# Patient Record
Sex: FEMALE | Race: WHITE | Employment: FULL TIME | URBAN - METROPOLITAN AREA
[De-identification: names, ages, dates, MRNs, and addresses within clinical notes are randomized per-mention and may not be internally consistent; named-entity substitution may affect disease eponyms.]

---

## 2017-03-15 PROCEDURE — 87086 URINE CULTURE/COLONY COUNT: CPT | Performed by: OBSTETRICS & GYNECOLOGY

## 2017-03-15 PROCEDURE — 87624 HPV HI-RISK TYP POOLED RSLT: CPT | Performed by: OBSTETRICS & GYNECOLOGY

## 2017-03-15 PROCEDURE — 88175 CYTOPATH C/V AUTO FLUID REDO: CPT | Performed by: OBSTETRICS & GYNECOLOGY

## 2017-03-15 PROCEDURE — 81001 URINALYSIS AUTO W/SCOPE: CPT | Performed by: OBSTETRICS & GYNECOLOGY

## 2018-04-25 PROCEDURE — 88175 CYTOPATH C/V AUTO FLUID REDO: CPT | Performed by: OBSTETRICS & GYNECOLOGY

## 2018-04-25 PROCEDURE — 87624 HPV HI-RISK TYP POOLED RSLT: CPT | Performed by: OBSTETRICS & GYNECOLOGY

## 2018-09-25 PROBLEM — Z79.891 CHRONIC PRESCRIPTION OPIATE USE: Status: ACTIVE | Noted: 2018-09-25

## 2019-10-15 PROBLEM — IMO0002 CHRONIC MIGRAINE: Status: ACTIVE | Noted: 2019-10-15

## 2020-03-21 ENCOUNTER — APPOINTMENT (OUTPATIENT)
Dept: ULTRASOUND IMAGING | Facility: HOSPITAL | Age: 40
End: 2020-03-21
Attending: INTERNAL MEDICINE
Payer: COMMERCIAL

## 2020-03-21 ENCOUNTER — HOSPITAL ENCOUNTER (OUTPATIENT)
Facility: HOSPITAL | Age: 40
Setting detail: OBSERVATION
Discharge: HOME | End: 2020-03-23
Attending: INTERNAL MEDICINE | Admitting: INTERNAL MEDICINE
Payer: COMMERCIAL

## 2020-03-21 PROBLEM — R19.7 DIARRHEA: Status: ACTIVE | Noted: 2020-03-21

## 2020-03-21 LAB
APAP SERPL-MCNC: <2 UG/ML (ref 10–30)
HAV IGM SER QL: NONREACTIVE
HBV CORE IGM SER QL: NONREACTIVE
HBV SURFACE AG SERPL QL IA: NONREACTIVE
HCV AB SERPL QL IA: NONREACTIVE

## 2020-03-21 PROCEDURE — 86256 FLUORESCENT ANTIBODY TITER: CPT | Performed by: INTERNAL MEDICINE

## 2020-03-21 PROCEDURE — 80329 ANALGESICS NON-OPIOID 1 OR 2: CPT | Performed by: INTERNAL MEDICINE

## 2020-03-21 PROCEDURE — 86403 PARTICLE AGGLUT ANTBDY SCRN: CPT | Performed by: INTERNAL MEDICINE

## 2020-03-21 PROCEDURE — 86645 CMV ANTIBODY IGM: CPT | Performed by: INTERNAL MEDICINE

## 2020-03-21 PROCEDURE — 93975 VASCULAR STUDY: CPT | Performed by: INTERNAL MEDICINE

## 2020-03-21 PROCEDURE — 86665 EPSTEIN-BARR CAPSID VCA: CPT | Performed by: INTERNAL MEDICINE

## 2020-03-21 PROCEDURE — 86664 EPSTEIN-BARR NUCLEAR ANTIGEN: CPT | Performed by: INTERNAL MEDICINE

## 2020-03-21 PROCEDURE — 83010 ASSAY OF HAPTOGLOBIN QUANT: CPT | Performed by: INTERNAL MEDICINE

## 2020-03-21 PROCEDURE — 86644 CMV ANTIBODY: CPT | Performed by: INTERNAL MEDICINE

## 2020-03-21 PROCEDURE — 86038 ANTINUCLEAR ANTIBODIES: CPT | Performed by: INTERNAL MEDICINE

## 2020-03-21 PROCEDURE — 80074 ACUTE HEPATITIS PANEL: CPT | Performed by: INTERNAL MEDICINE

## 2020-03-21 PROCEDURE — 87798 DETECT AGENT NOS DNA AMP: CPT | Performed by: INTERNAL MEDICINE

## 2020-03-21 PROCEDURE — 83516 IMMUNOASSAY NONANTIBODY: CPT | Performed by: INTERNAL MEDICINE

## 2020-03-21 PROCEDURE — 76700 US EXAM ABDOM COMPLETE: CPT | Performed by: INTERNAL MEDICINE

## 2020-03-21 RX ORDER — SODIUM PHOSPHATE, DIBASIC AND SODIUM PHOSPHATE, MONOBASIC 7; 19 G/133ML; G/133ML
1 ENEMA RECTAL ONCE AS NEEDED
Status: DISCONTINUED | OUTPATIENT
Start: 2020-03-21 | End: 2020-03-23

## 2020-03-21 RX ORDER — LEVOFLOXACIN 5 MG/ML
750 INJECTION, SOLUTION INTRAVENOUS EVERY 24 HOURS
Status: DISCONTINUED | OUTPATIENT
Start: 2020-03-21 | End: 2020-03-23

## 2020-03-21 RX ORDER — POTASSIUM CHLORIDE 20 MEQ/1
40 TABLET, EXTENDED RELEASE ORAL ONCE
Status: COMPLETED | OUTPATIENT
Start: 2020-03-21 | End: 2020-03-21

## 2020-03-21 RX ORDER — METRONIDAZOLE 500 MG/100ML
500 INJECTION, SOLUTION INTRAVENOUS EVERY 8 HOURS
Status: DISCONTINUED | OUTPATIENT
Start: 2020-03-21 | End: 2020-03-23

## 2020-03-21 RX ORDER — POLYETHYLENE GLYCOL 3350 17 G/17G
17 POWDER, FOR SOLUTION ORAL DAILY PRN
Status: DISCONTINUED | OUTPATIENT
Start: 2020-03-21 | End: 2020-03-23

## 2020-03-21 RX ORDER — TOPIRAMATE 100 MG/1
100 TABLET, FILM COATED ORAL 2 TIMES DAILY
Status: DISCONTINUED | OUTPATIENT
Start: 2020-03-21 | End: 2020-03-22

## 2020-03-21 RX ORDER — ENOXAPARIN SODIUM 100 MG/ML
40 INJECTION SUBCUTANEOUS EVERY EVENING
Status: DISCONTINUED | OUTPATIENT
Start: 2020-03-21 | End: 2020-03-23

## 2020-03-21 RX ORDER — BISACODYL 10 MG
10 SUPPOSITORY, RECTAL RECTAL
Status: DISCONTINUED | OUTPATIENT
Start: 2020-03-21 | End: 2020-03-23

## 2020-03-21 RX ORDER — ONDANSETRON 2 MG/ML
4 INJECTION INTRAMUSCULAR; INTRAVENOUS EVERY 6 HOURS PRN
Status: DISCONTINUED | OUTPATIENT
Start: 2020-03-21 | End: 2020-03-23

## 2020-03-21 RX ORDER — NORTRIPTYLINE HYDROCHLORIDE 25 MG/1
25 CAPSULE ORAL DAILY
Status: DISCONTINUED | OUTPATIENT
Start: 2020-03-22 | End: 2020-03-22

## 2020-03-21 RX ORDER — ALPRAZOLAM 0.5 MG/1
0.5 TABLET ORAL 2 TIMES DAILY PRN
Status: DISCONTINUED | OUTPATIENT
Start: 2020-03-21 | End: 2020-03-23

## 2020-03-21 RX ORDER — ACETAMINOPHEN 325 MG/1
650 TABLET ORAL EVERY 6 HOURS PRN
Status: DISCONTINUED | OUTPATIENT
Start: 2020-03-21 | End: 2020-03-21 | Stop reason: SDUPTHER

## 2020-03-21 RX ORDER — METOCLOPRAMIDE HYDROCHLORIDE 5 MG/ML
10 INJECTION INTRAMUSCULAR; INTRAVENOUS EVERY 8 HOURS PRN
Status: DISCONTINUED | OUTPATIENT
Start: 2020-03-21 | End: 2020-03-23

## 2020-03-21 RX ORDER — DOCUSATE SODIUM 100 MG/1
100 CAPSULE, LIQUID FILLED ORAL 2 TIMES DAILY
Status: DISCONTINUED | OUTPATIENT
Start: 2020-03-21 | End: 2020-03-23

## 2020-03-21 RX ORDER — ACETAMINOPHEN 325 MG/1
650 TABLET ORAL EVERY 6 HOURS PRN
Status: DISCONTINUED | OUTPATIENT
Start: 2020-03-21 | End: 2020-03-21

## 2020-03-21 RX ORDER — SODIUM CHLORIDE 9 MG/ML
INJECTION, SOLUTION INTRAVENOUS CONTINUOUS
Status: DISCONTINUED | OUTPATIENT
Start: 2020-03-21 | End: 2020-03-23

## 2020-03-21 RX ORDER — TRAZODONE HYDROCHLORIDE 50 MG/1
50 TABLET ORAL NIGHTLY
Status: DISCONTINUED | OUTPATIENT
Start: 2020-03-21 | End: 2020-03-23

## 2020-03-21 NOTE — PROGRESS NOTES
Taylor Aguilar NOTIFIED OF PATIENT ARRIVAL AND NEED OF ADMISSION ORDERS. DR. John Fontana ARRIVED TO SEE PATIENT.

## 2020-03-21 NOTE — PLAN OF CARE
Problem: Patient/Family Goals  Goal: Patient/Family Long Term Goal  Description  Patient's Long Term Goal: DISCHARGE    Interventions:  - PAIN TOLERABLE  -TOLERATING DIET    - See additional Care Plan goals for specific interventions   Outcome: Progressi appropriate nutritional intake (bariatric)  Description  INTERVENTIONS:  - Monitor for over-consumption  - Identify factors contributing to increased intake, treat as appropriate  - Monitor I&O, WT and lab values  - Obtain nutritional consult as needed  -

## 2020-03-22 ENCOUNTER — APPOINTMENT (OUTPATIENT)
Dept: MRI IMAGING | Facility: HOSPITAL | Age: 40
End: 2020-03-22
Attending: INTERNAL MEDICINE
Payer: COMMERCIAL

## 2020-03-22 LAB
ALBUMIN SERPL-MCNC: 2.7 G/DL (ref 3.4–5)
ALBUMIN/GLOB SERPL: 0.8 {RATIO} (ref 1–2)
ALP LIVER SERPL-CCNC: 97 U/L (ref 37–98)
ALT SERPL-CCNC: 588 U/L (ref 13–56)
AMMONIA PLAS-MCNC: 41 UMOL/L (ref 11–32)
ANION GAP SERPL CALC-SCNC: 8 MMOL/L (ref 0–18)
AST SERPL-CCNC: 327 U/L (ref 15–37)
BASOPHILS # BLD AUTO: 0.07 X10(3) UL (ref 0–0.2)
BASOPHILS NFR BLD AUTO: 1.5 %
BILIRUB DIRECT SERPL-MCNC: 5.1 MG/DL (ref 0–0.2)
BILIRUB SERPL-MCNC: 6.4 MG/DL (ref 0.1–2)
BUN BLD-MCNC: 5 MG/DL (ref 7–18)
BUN/CREAT SERPL: 7.2 (ref 10–20)
CALCIUM BLD-MCNC: 8 MG/DL (ref 8.5–10.1)
CHLORIDE SERPL-SCNC: 115 MMOL/L (ref 98–112)
CO2 SERPL-SCNC: 18 MMOL/L (ref 21–32)
CREAT BLD-MCNC: 0.69 MG/DL (ref 0.55–1.02)
DEPRECATED RDW RBC AUTO: 45.9 FL (ref 35.1–46.3)
EOSINOPHIL # BLD AUTO: 0.14 X10(3) UL (ref 0–0.7)
EOSINOPHIL NFR BLD AUTO: 3.1 %
ERYTHROCYTE [DISTWIDTH] IN BLOOD BY AUTOMATED COUNT: 14 % (ref 11–15)
GLOBULIN PLAS-MCNC: 3.2 G/DL (ref 2.8–4.4)
GLUCOSE BLD-MCNC: 77 MG/DL (ref 70–99)
HAPTOGLOB SERPL-MCNC: 39.7 MG/DL (ref 30–200)
HCT VFR BLD AUTO: 39.1 % (ref 35–48)
HGB BLD-MCNC: 13.1 G/DL (ref 12–16)
IMM GRANULOCYTES # BLD AUTO: 0.01 X10(3) UL (ref 0–1)
IMM GRANULOCYTES NFR BLD: 0.2 %
LDH SERPL L TO P-CCNC: 225 U/L
LYMPHOCYTES # BLD AUTO: 1.09 X10(3) UL (ref 1–4)
LYMPHOCYTES NFR BLD AUTO: 23.9 %
M PROTEIN MFR SERPL ELPH: 5.9 G/DL (ref 6.4–8.2)
MCH RBC QN AUTO: 30.2 PG (ref 26–34)
MCHC RBC AUTO-ENTMCNC: 33.5 G/DL (ref 31–37)
MCV RBC AUTO: 90.1 FL (ref 80–100)
MONOCYTES # BLD AUTO: 0.58 X10(3) UL (ref 0.1–1)
MONOCYTES NFR BLD AUTO: 12.7 %
MONOSCREEN: NEGATIVE
NEUTROPHILS # BLD AUTO: 2.68 X10 (3) UL (ref 1.5–7.7)
NEUTROPHILS # BLD AUTO: 2.68 X10(3) UL (ref 1.5–7.7)
NEUTROPHILS NFR BLD AUTO: 58.6 %
OSMOLALITY SERPL CALC.SUM OF ELEC: 288 MOSM/KG (ref 275–295)
PLATELET # BLD AUTO: 278 10(3)UL (ref 150–450)
POTASSIUM SERPL-SCNC: 3.9 MMOL/L (ref 3.5–5.1)
RBC # BLD AUTO: 4.34 X10(6)UL (ref 3.8–5.3)
SODIUM SERPL-SCNC: 141 MMOL/L (ref 136–145)
URATE SERPL-MCNC: 6.3 MG/DL (ref 2.6–6)
WBC # BLD AUTO: 4.6 X10(3) UL (ref 4–11)

## 2020-03-22 PROCEDURE — 83615 LACTATE (LD) (LDH) ENZYME: CPT | Performed by: INTERNAL MEDICINE

## 2020-03-22 PROCEDURE — 84550 ASSAY OF BLOOD/URIC ACID: CPT | Performed by: INTERNAL MEDICINE

## 2020-03-22 PROCEDURE — 74183 MRI ABD W/O CNTR FLWD CNTR: CPT | Performed by: INTERNAL MEDICINE

## 2020-03-22 PROCEDURE — 85025 COMPLETE CBC W/AUTO DIFF WBC: CPT | Performed by: INTERNAL MEDICINE

## 2020-03-22 PROCEDURE — A9581 GADOXETATE DISODIUM INJ: HCPCS | Performed by: INTERNAL MEDICINE

## 2020-03-22 PROCEDURE — 76376 3D RENDER W/INTRP POSTPROCES: CPT | Performed by: INTERNAL MEDICINE

## 2020-03-22 PROCEDURE — 86038 ANTINUCLEAR ANTIBODIES: CPT | Performed by: INTERNAL MEDICINE

## 2020-03-22 PROCEDURE — 82140 ASSAY OF AMMONIA: CPT | Performed by: INTERNAL MEDICINE

## 2020-03-22 PROCEDURE — 82248 BILIRUBIN DIRECT: CPT | Performed by: INTERNAL MEDICINE

## 2020-03-22 PROCEDURE — 80053 COMPREHEN METABOLIC PANEL: CPT | Performed by: INTERNAL MEDICINE

## 2020-03-22 PROCEDURE — 87529 HSV DNA AMP PROBE: CPT | Performed by: INTERNAL MEDICINE

## 2020-03-22 RX ORDER — MORPHINE SULFATE 4 MG/ML
2 INJECTION, SOLUTION INTRAMUSCULAR; INTRAVENOUS EVERY 2 HOUR PRN
Status: DISCONTINUED | OUTPATIENT
Start: 2020-03-22 | End: 2020-03-23

## 2020-03-22 RX ORDER — SUMATRIPTAN 6 MG/.5ML
6 INJECTION, SOLUTION SUBCUTANEOUS ONCE
Status: COMPLETED | OUTPATIENT
Start: 2020-03-22 | End: 2020-03-22

## 2020-03-22 RX ORDER — MORPHINE SULFATE 4 MG/ML
1 INJECTION, SOLUTION INTRAMUSCULAR; INTRAVENOUS EVERY 2 HOUR PRN
Status: DISCONTINUED | OUTPATIENT
Start: 2020-03-22 | End: 2020-03-23

## 2020-03-22 RX ORDER — MORPHINE SULFATE 4 MG/ML
4 INJECTION, SOLUTION INTRAMUSCULAR; INTRAVENOUS EVERY 2 HOUR PRN
Status: CANCELLED | OUTPATIENT
Start: 2020-03-22

## 2020-03-22 RX ORDER — MORPHINE SULFATE 4 MG/ML
2 INJECTION, SOLUTION INTRAMUSCULAR; INTRAVENOUS EVERY 2 HOUR PRN
Status: CANCELLED | OUTPATIENT
Start: 2020-03-22

## 2020-03-22 RX ORDER — BUTALBITAL, ACETAMINOPHEN AND CAFFEINE 50; 325; 40 MG/1; MG/1; MG/1
1 TABLET ORAL EVERY 4 HOURS PRN
COMMUNITY
End: 2020-05-19 | Stop reason: ALTCHOICE

## 2020-03-22 NOTE — CONSULTS
[facility]  Report of Consultation    Sixto Hernandez Patient Status:  Observation    3/9/1980 MRN [id number]   [facility] 3NW-A Attending Lavern Vera MD   Hosp Day # 0 PCP Enrique Iniguez MD     Reason for [facility] Migraines     Migraine without aura and without status migrainosus, not intractable     Chronic prescription opiate use     Chronic migraine     Diarrhea        History:  Past Medical History:   Diagnosis Date   • Anxiety state, unspecified     airplanes injection 4 mg, 4 mg, Intravenous, Q6H PRN  Metoclopramide HCl (REGLAN) injection 10 mg, 10 mg, Intravenous, Q8H PRN  ALPRAZolam (XANAX) tab 0.5 mg, 0.5 mg, Oral, BID PRN  Nortriptyline HCl (PAMELOR) cap 25 mg, 25 mg, Oral, Daily  topiramate (Topamax) tab nasal congestion, sinus pain or sore throat, hearing changes  RESPIRATORY: denies new/changing shortness of breath, wheezing or cough   CARDIOVASCULAR: denies chest pain or ESPANA; no palpitations   GI: as above  GENITAL//GYN: no dysuria, urgency or frequen MCHC 34.4 33.5   RDW 14.0 14.0   NEPRELIM  --  2.68   WBC 5.03 4.6    278.0       Recent Labs   Lab 03/21/20  1321 03/22/20  0510   ALKPHO 117* 97   BILT 5.94* 6.4*   TP 7.0 5.9*   ALB 3.9 2.7*   * 588*   * 327*       inr 1.2 3/21/20 INDICATION: 36years-old Female with  Left lower quadrant pain. COMPARISON: CT abdomen and pelvis, 6/24/2014. TECHNIQUE: Contrast enhanced helical CT images of the abdomen and pelvis. IV contrast was given  uneventfully. Contrast amount: 80 mL.   Dose re partially air-filled. .  Body wall and musculoskeletal: Small fat-containing vertebral hernia. Minimal disc bulge at L5-S1. NO compression fractures or aggressive osseous lesions are seen. Mild dextrocurvature of the lumbar  spine.  Punctate sclerotic bone about her care today as well    The patient indicates understanding of these issues and agrees to the plan.     David Stanley  3/22/2020  6:43 AM

## 2020-03-22 NOTE — PROGRESS NOTES
[facility] Hospitalist Progress Note                                                                   [address] [facility]  3/9/1980    SUBJECTIVE:  No acute events.   Has very mild abdominal discomf [address] of liver  - case dw Dr Esparza [facility] liver/GB not impressive- minimal GB wall thickening likely within normal limits  - acute hep panel neg, tylenol level unremarkable  - hold Home nortriptyline and topamax as they can cause abml LFTs though she has bee

## 2020-03-22 NOTE — PROGRESS NOTES
**AGREE WITH CHARTING OF BRIANDA LOPEZ RN.    8510: PAGED DR. HERRON TO UPDATE HIM ON MRI RESULTS. ORDERS RECEIVED TO START PATIENT ON A CLEAR LIQUID DIET.

## 2020-03-22 NOTE — PLAN OF CARE
Problem: Patient/Family Goals  Goal: Patient/Family Long Term Goal  Description  Patient's Long Term Goal: DISCHARGE    Interventions:  - PAIN TOLERABLE  -TOLERATING DIET    - See additional Care Plan goals for specific interventions   Outcome: Progressi appropriate nutritional intake (bariatric)  Description  INTERVENTIONS:  - Monitor for over-consumption  - Identify factors contributing to increased intake, treat as appropriate  - Monitor I&O, WT and lab values  - Obtain nutritional consult as needed  - Denies flatus. States last BM was either last night or early this morning. Awaiting stool sample. Complains of difficulty starting stream but denies pain or burning with urination. POC discussed with pt, pt verbalized understanding.  Will continue to [facility]

## 2020-03-22 NOTE — CONSULTS
Pharmacy Consult Note:  Medication List Evaluation           Peace Mora is a 36year old female for whom pharmacy was consulted to review the pt's medication list for meds that have the potential to increase liver enzymes.       Consulting physici

## 2020-03-22 NOTE — H&P
MEET Hospitalist History and Physical      CC:  Abdominal pain, jaundice    PCP: Scott Lopez MD      History of Present Illness: Patient is a 36year old female with PMH sig for migraine HA, likely [address] live presents for abdominal pain and jaundice. HPI.      OBJECTIVE:  /80 (BP Location: Right arm)   Pulse 109   Temp 98.3 °F (36.8 °C) (Oral)   Resp 18   LMP 02/22/2020 (Approximate)   SpO2 100%   General:  Alert, no distress, appears stated age.     Head:  Normocephalic, without obvious abnormali uneventfully. Contrast amount: 80 mL. Dose reduction CT scan done according to ALARA (As Low as Reasonably Achievable) or ALARA/IMAGE GENTLY. FINDINGS: Visualized lower thorax: Cardiac size is normal. Clear lower lungs.  Liver: As seen on previous CT and M sclerotic bone island seen in the LEFT femoral head. IMPRESSION: 1. Contracted gallbladder, with nonspecific trace pericholecystic fluid. NO significant biliary duct dilatation. NO CT evidence of cholelithiasis or choledocholithiasis.  Correlate with mary jane Pulsatile waveforms in the aorta. Splenic vein is patent. CONCLUSION:   1. Minimal heterogeneity of the liver is likely within normal limits. 2. Minimal gallbladder wall thickening is likely within normal limits as well.   3. Vascularity in the upper

## 2020-03-22 NOTE — PROGRESS NOTES
PATIENT ON CLEAR LIQUID DIET. HAS IV FLUIDS INFUSING, HAS FLAGYL AND LEVAQUIN SCHEDULED. PATIENT AMBULATES INDEPENDENTLY. C-DIFF ISOLATION DISCONTINUED DUE TO NOT HAVING A BOWEL IN 24HRS. WILL CONTINUE TO MONITOR.

## 2020-03-22 NOTE — PROGRESS NOTES
**AGREE WITH CHARTING OF BRIANDA LOPEZ RN.    2798: PAGED DR. HERRON TO NOTIFY OF NEW CONSULT. ORDERS RECEIVED.

## 2020-03-23 VITALS
SYSTOLIC BLOOD PRESSURE: 120 MMHG | HEART RATE: 105 BPM | RESPIRATION RATE: 20 BRPM | TEMPERATURE: 98 F | DIASTOLIC BLOOD PRESSURE: 75 MMHG | OXYGEN SATURATION: 100 %

## 2020-03-23 LAB
ALBUMIN SERPL-MCNC: 2.7 G/DL (ref 3.4–5)
ALBUMIN/GLOB SERPL: 0.9 {RATIO} (ref 1–2)
ALP LIVER SERPL-CCNC: 94 U/L (ref 37–98)
ALT SERPL-CCNC: 575 U/L (ref 13–56)
ANION GAP SERPL CALC-SCNC: 7 MMOL/L (ref 0–18)
AST SERPL-CCNC: 291 U/L (ref 15–37)
BILIRUB SERPL-MCNC: 7 MG/DL (ref 0.1–2)
BUN BLD-MCNC: 5 MG/DL (ref 7–18)
BUN/CREAT SERPL: 7 (ref 10–20)
CALCIUM BLD-MCNC: 8.5 MG/DL (ref 8.5–10.1)
CERULOPLASMIN SERPL-MCNC: 30.9 MG/DL (ref 20–60)
CHLORIDE SERPL-SCNC: 114 MMOL/L (ref 98–112)
CMV ANTIBODY IGG: <0.2 U/ML
CMV ANTIBODY IGM: <8 AU/ML
CO2 SERPL-SCNC: 19 MMOL/L (ref 21–32)
CREAT BLD-MCNC: 0.71 MG/DL (ref 0.55–1.02)
EBV NA IGG SER QL IA: POSITIVE
EBV VCA IGG SER QL IA: POSITIVE
EBV VCA IGM SER QL IA: NEGATIVE
F-ACTIN (SMOOTH MUSCLE) AB: 21 UNITS
GLOBULIN PLAS-MCNC: 3 G/DL (ref 2.8–4.4)
GLUCOSE BLD-MCNC: 82 MG/DL (ref 70–99)
M PROTEIN MFR SERPL ELPH: 5.7 G/DL (ref 6.4–8.2)
OSMOLALITY SERPL CALC.SUM OF ELEC: 286 MOSM/KG (ref 275–295)
POTASSIUM SERPL-SCNC: 3.7 MMOL/L (ref 3.5–5.1)
SODIUM SERPL-SCNC: 140 MMOL/L (ref 136–145)

## 2020-03-23 PROCEDURE — 82390 ASSAY OF CERULOPLASMIN: CPT | Performed by: INTERNAL MEDICINE

## 2020-03-23 PROCEDURE — 80053 COMPREHEN METABOLIC PANEL: CPT | Performed by: INTERNAL MEDICINE

## 2020-03-23 RX ORDER — SUMATRIPTAN 6 MG/.5ML
6 INJECTION, SOLUTION SUBCUTANEOUS ONCE
Status: COMPLETED | OUTPATIENT
Start: 2020-03-23 | End: 2020-03-23

## 2020-03-23 RX ORDER — METRONIDAZOLE 500 MG/1
TABLET ORAL
Qty: 15 TABLET | Refills: 2 | Status: SHIPPED | OUTPATIENT
Start: 2020-03-23 | End: 2020-07-09

## 2020-03-23 RX ORDER — POTASSIUM CHLORIDE 20 MEQ/1
40 TABLET, EXTENDED RELEASE ORAL ONCE
Status: COMPLETED | OUTPATIENT
Start: 2020-03-23 | End: 2020-03-23

## 2020-03-23 NOTE — PLAN OF CARE
Problem: Patient/Family Goals  Goal: Patient/Family Long Term Goal  Description  Patient's Long Term Goal: DISCHARGE    Interventions:  - PAIN TOLERABLE  -TOLERATING DIET    - See additional Care Plan goals for specific interventions   Outcome: Adequate environment  Outcome: Adequate for Discharge  Goal: Achieves appropriate nutritional intake (bariatric)  Description  INTERVENTIONS:  - Monitor for over-consumption  - Identify factors contributing to increased intake, treat as appropriate  - Monitor I&O,

## 2020-03-23 NOTE — PLAN OF CARE
Pt tolerated regular diet, no nausea, no pain. IVF stopped, PIV removed. Catheter was intact. Discharging instructions reviewed, all questions answered. Verbalized understanding. Left SCU via wheelchair. Belongings taken by pt.  Spouse waiting at the [facility]

## 2020-03-23 NOTE — DISCHARGE SUMMARY
General Medicine Discharge Summary     Patient ID:  Danna Kim  36year old  3/9/1980    Admit date: 3/21/2020    Discharge date and time:3/23/2020    Attending Physician: Tali Whipple mouth every 4 (four) hours as needed for Pain.     Norethin Ace-Eth Estrad-FE (BLISOVI FE 1.5/30) 1.5-30 MG-MCG Oral Tab  TAKE 1 TABLET BY MOUTH DAILY AS DIRECTED    triamcinolone acetonide 0.1 % External Cream  Apply bid x 10 days    topiramate 100 MG Oral

## 2020-03-24 LAB
EPSTEIN BARR VIRUS BY PCR: NOT DETECTED
HSV 1 SUBTYPE BY PCR: NOT DETECTED
HSV 2 SUBTYPE BY PCR: NOT DETECTED

## 2020-03-24 NOTE — PAYOR COMM NOTE
--------------  DISCHARGE REVIEW    Payor: Jesus Leon  #:  [id number]  Authorization Number: [place] date: 3/21/20  Admit time:  1835  Discharge Date: 3/23/2020  1:55 PM     Admitting Physician: Cha Higgins MD  Atte thickening likely within normal limits  - acute hep panel neg, tylenol level unremarkable  - MRI liver unremarkable  - ok to resume topamax per GI. No NSAIDs. Repeat CMP on wed.   Dr Gallegos Rolling to follow  - empiric abx- 5 more da PO flagyl on dc    Consults: BS  Extremities: no pedal edema   Neuro: CN inact, no focal deficits  Ansley Williamson  Hospitalist  3/23/2020 10:53 AM      REVIEWER COMMENTS    FOR FINAL REVIEW/APPROVAL X 2 INPT DAYS

## 2020-03-24 NOTE — PAYOR COMM NOTE
--------------  ADMISSION REVIEW     Payor: Jesus Leon  #:  [id number]  Authorization Number: [id number]         H&P - H&P Note           [facility] Hospitalist History and Physical      CC:  Abdominal pain, jaundice    PCP: Rosi Kirkland Throat: Lips, mucosa, and tongue normal. Teeth and gums normal.   Neck: Supple, symmetrical, trachea midline, no cervical or supraclavicular lymph adenopathy, thyroid: no enlargment/tenderness/nodules appreciated   Lungs:   Clear to auscultation bilatera decreased in size previously, measuring up to 15 mm, previously measuring up to 65 mm. NO additional hepatic lesions seen. Mild periportal edema noted. Smooth hepatic surface. Patent portal and hepatic vessels.  Gallbladder and biliary system: Contracted ga focal nodular hyperplasia. 3. NO CT evidence of hydroureteronephrosis or nephroureterolithiasis. Physiologic symmetric enhancement of the kidneys. 4. Nonobstructive bowel gas pattern. NO pericolonic stranding.           Us Abdomen Complete With Doppler(cpt= PM             Assessment/Plan:     # abd pain  # elevated LFTs  # abdnormal CT of GB  # ho likely FNH of liver  - trend LFTs, check US abd, empiric abx, check acute hep panel  - GI consulted  - hold Home nortriptyline and topamax as they can cause abml LF regular rate and rhythm, no murmur. Normal PMI.     Abd: Abdomen soft, nontender, nondistended, no organomegaly, bowel sounds present  MSK: Full range of motion in extremities, no clubbing, no cyanosis  Skin: no rashes or lesions     Labs:         Recent L Latest Ref Range: 2.8 - 4.4 g/dL  3.2   LDH Latest Ref Range: 84 - 246 U/L  225   URIC ACID Latest Ref Range: 2.6 - 6.0 mg/dL  6.3 (H)   Lipase, Serum Latest Ref Range: 13 - 60 U/L 86 (H)

## 2020-03-25 LAB — ANA SCREEN: NEGATIVE

## 2020-03-26 NOTE — PROGRESS NOTES
Blood tests for \"HSV\" (herpes simplex) and \"EBV\" (ebstein barr) viruses were both okay    Blood test for immune hepatitis was mildly elevated    -->I spoke w/ Dr Jamie Rojas, he is following up w/ her for further eval/treatment    MD Jazz Diaz

## (undated) NOTE — LETTER
4/6/2020  [place] [id number]              Dear Sonja Olivera,       Here are your results from your recent visit with Gastroenterology.      Blood tests for \"HSV\" (herpes simplex) and \"EBV\" (ebstein

## (undated) NOTE — LETTER
March 23, 2020      Patient: Pepper Be   YOB: 1980   Date of Visit: 3/21/2020         To Whom It May Concern: This certifies that Pepper Be was under my care from 3/21-3/23. Please excuse her from work.   She may retur